# Patient Record
(demographics unavailable — no encounter records)

---

## 2024-10-16 NOTE — HISTORY OF PRESENT ILLNESS
[FreeTextEntry1] : Problem: 1) ASCUS HPV 16 + 2) thickened EMS 0.76cm 3) h/o breast ca s/p R mastectomy 2013 f/b RT?  Previous Therapies: 1) 9/30/24: PAP ASCUS; HPV 16 pos 2) TVUS 9/30/24     a) EMS 0.76cm     b) Uterus 6 x 3.5 x 3.4cm     c) ovaries not identified  63yo referred by Dr. Sommers for thickened EMS and abnormal pap smear. Pt states 2021 she had a routine US which showed thickened endometrium. She had an EMB attempted in the office and failed and proceeded to have a hysteroscopy D&C and per pt it was benign. She reports HPV+ at that time, she endorses having a colposcopy and states her OBGYN told her to come back every 6 months but pt states she lost insurance and has followed up until now. She denies VB, abnormal discharge, vaginal dryness, night sweats, and moodswings  OBHX: C/S x1 GYNHX: h/o breast cancer dx 52yo s/p R mastectomy (unknown type of Breast Ca per pt) s/p radiation, h/o tamoxifen use 10+yrs ago stopped due to side effects, 52yo menopause, h/o endometriosis PMHX: h/o breast ca 2013, HLD, pt endorses current R lung colpase?  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

## 2024-10-16 NOTE — DISCUSSION/SUMMARY
[Reviewed Clinical Lab Test(s)] : Results of clinical tests were reviewed. [Reviewed Radiology Report(s)] : Radiology reports were reviewed. [Visit Time ___ Minutes] : [unfilled] minutes [Face to Face Time___ Minutes] : with [unfilled] minutes in face to face consultation. [FreeTextEntry1] : With the aid of diagrams we reviewed the findings in detail.  We reviewed HPV its pathogenesis and the implications of an abnormal cervical cytology and the pathogenesis of dysplasia in detail.    ASCCP recommendations reviewed with patient. The recommendation is for colposcopic evaluation and biopsies if abnormal lesions noted. If colposcopy is negative, follow-up includes repeat co-testing at 12 months.   I discussed with the patient, reviewed the findings on history and physical examination, and reviewed the imaging studies in detail. She has a thickened endometrial lining.  Benign, pre-malignant (such as atypical hyperplasia) and malignant causes of these findings discussed. In order to determine the cause of her symptoms, sampling of the uterus is necessary. The uterus can be sampled either by an in-office endometrial biopsy or via D&C hysteroscopy. In the case of suspected endometrial polyp, a D&C/hysteroscopy is indicated. This allows for removal of the endometrial polyp. This is both diagnostic and therapeutic.    Complications that include, but are not limited to: bleeding, infection and uterine perforation discussed. In the case of uterine perforation, diagnostic laparoscopy may be indicated. Cervical stenosis and possible inability to enter the uterine cavity was also discussed.   Surgical scheduling was discussed and instructions for optimization prior to surgery were given. NPO after midnight.  No aspirin or NSAID products for 1 week prior. Instructions for misoprostol 48 hours prior to surgery given.  A copy of the above diagrams was given to the patient.    [ ] Colposcopy performed today, f/u results and need for excision [ ] Pt to be scheduled for hysterosocpy D&C [ ] Pt given [facility] pre-op clinic information to make appointment for clearance once surgery booked

## 2024-10-16 NOTE — DISCUSSION/SUMMARY
[Reviewed Clinical Lab Test(s)] : Results of clinical tests were reviewed. [Reviewed Radiology Report(s)] : Radiology reports were reviewed. [Visit Time ___ Minutes] : [unfilled] minutes [Face to Face Time___ Minutes] : with [unfilled] minutes in face to face consultation. [FreeTextEntry1] : With the aid of diagrams we reviewed the findings in detail.  We reviewed HPV its pathogenesis and the implications of an abnormal cervical cytology and the pathogenesis of dysplasia in detail.    ASCCP recommendations reviewed with patient. The recommendation is for colposcopic evaluation and biopsies if abnormal lesions noted. If colposcopy is negative, follow-up includes repeat co-testing at 12 months.   I discussed with the patient, reviewed the findings on history and physical examination, and reviewed the imaging studies in detail. She has a thickened endometrial lining.  Benign, pre-malignant (such as atypical hyperplasia) and malignant causes of these findings discussed. In order to determine the cause of her symptoms, sampling of the uterus is necessary. The uterus can be sampled either by an in-office endometrial biopsy or via D&C hysteroscopy. In the case of suspected endometrial polyp, a D&C/hysteroscopy is indicated. This allows for removal of the endometrial polyp. This is both diagnostic and therapeutic.    Complications that include, but are not limited to: bleeding, infection and uterine perforation discussed. In the case of uterine perforation, diagnostic laparoscopy may be indicated. Cervical stenosis and possible inability to enter the uterine cavity was also discussed.   Surgical scheduling was discussed and instructions for optimization prior to surgery were given. NPO after midnight.  No aspirin or NSAID products for 1 week prior. Instructions for misoprostol 48 hours prior to surgery given.  A copy of the above diagrams was given to the patient.    [ ] Colposcopy performed today, f/u results and need for excision [ ] Pt to be scheduled for hysterosocpy D&C [ ] Pt given [facility] pre-op clinic information to make appointment for clearance once surgery booked

## 2024-10-16 NOTE — PROCEDURE
[Colposcopy] : colposcopy [Abnormal Pap] : an abnormal pap smear [Patient] : the patient [Verbal Consent] : verbal consent was obtained prior to the procedure and is detailed in the patient's record [Normal Staining] : normal staining [No Abnormalities] : no abnormalities seen [Biopsies Taken: # ___] : [unfilled] biopsies taken of the cervix [Biopsy Locations ___ o'clock] : the biopsies were taken at the [unfilled] o'clock position [Silver Nitrate] : silver nitrate [No Complications] : none [Tolerated Well] : the patient tolerated the procedure well [Post procedure instructions and information given] : post procedure instructions and information given and reviewed with patient. [0] : 0 [Biopsy] : the lesion was not biopsied

## 2024-10-16 NOTE — PHYSICAL EXAM
[Chaperone Present] : A chaperone was present in the examining room during all aspects of the physical examination [87073] : A chaperone was present during the pelvic exam. [Normal] : Anus and perineum: Normal sphincter tone, no masses, no prolapse. [Fully active, able to carry on all pre-disease performance without restriction] : Status 0 - Fully active, able to carry on all pre-disease performance without restriction [FreeTextEntry2] : Desire

## 2024-10-16 NOTE — PHYSICAL EXAM
[Chaperone Present] : A chaperone was present in the examining room during all aspects of the physical examination [32094] : A chaperone was present during the pelvic exam. [Normal] : Anus and perineum: Normal sphincter tone, no masses, no prolapse. [Fully active, able to carry on all pre-disease performance without restriction] : Status 0 - Fully active, able to carry on all pre-disease performance without restriction [FreeTextEntry2] : Desire

## 2024-10-16 NOTE — HISTORY OF PRESENT ILLNESS
[FreeTextEntry1] : Problem: 1) ASCUS HPV 16 + 2) thickened EMS 0.76cm 3) h/o breast ca s/p R mastectomy 2013 f/b RT?  Previous Therapies: 1) 9/30/24: PAP ASCUS; HPV 16 pos 2) TVUS 9/30/24     a) EMS 0.76cm     b) Uterus 6 x 3.5 x 3.4cm     c) ovaries not identified  63yo referred by Dr. Sommers for thickened EMS and abnormal pap smear. Pt states 2021 she had a routine US which showed thickened endometrium. She had an EMB attempted in the office and failed and proceeded to have a hysteroscopy D&C and per pt it was benign. She reports HPV+ at that time, she endorses having a colposcopy and states her OBGYN told her to come back every 6 months but pt states she lost insurance and has followed up until now. She denies VB, abnormal discharge, vaginal dryness, night sweats, and moodswings  OBHX: C/S x1 GYNHX: h/o breast cancer dx 52yo s/p R mastectomy (unknown type of Breast Ca per pt) s/p radiation, h/o tamoxifen use 10+yrs ago stopped due to side effects, 52yo menopause, h/o endometriosis PMHX: h/o breast ca 2013, HLD, pt endorses current R lung colpase?  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

## 2024-11-08 NOTE — HISTORY OF PRESENT ILLNESS
[No Pertinent Cardiac History] : no history of aortic stenosis, atrial fibrillation, coronary artery disease, recent myocardial infarction, or implantable device/pacemaker [No Pertinent Pulmonary History] : no history of asthma, COPD, sleep apnea, or smoking [No Adverse Anesthesia Reaction] : no adverse anesthesia reaction in self or family member [(Patient denies any chest pain, claudication, dyspnea on exertion, orthopnea, palpitations or syncope)] : Patient denies any chest pain, claudication, dyspnea on exertion, orthopnea, palpitations or syncope [Excellent (>10 METs)] : Excellent (>10 METs) [Chronic Anticoagulation] : no chronic anticoagulation [Chronic Kidney Disease] : no chronic kidney disease [Diabetes] : no diabetes [FreeTextEntry1] : hysteroscopy with biopsy [FreeTextEntry2] : 11/8/24 [FreeTextEntry3] : Dr. Jacque Garcia [FreeTextEntry4] : 62 F with PMHx breast cancer (s/p mastectomy and reconstruction), abnormal PAP with thickened endometrium who presents for preoperative clearance for hysteroscopy with biopsy on 11/8/24 with Dr. Garcia. She feels well today without any acute complaints other than a sinus infection for which she is being treated with ciprofloxacin.

## 2024-11-08 NOTE — ASSESSMENT
[Patient Optimized for Surgery] : Patient optimized for surgery [No Further Testing Recommended] : no further testing recommended [Continue medications as is] : Continue current medications [As per surgery] : as per surgery [FreeTextEntry4] : Patient is low risk for low risk procedure  RCRI class 1, Perez score 0% EKG NSR  Labwork collected today - for a low risk procedure such as a hysteroscopy with biopsy, patient should not need presurgical bloodwork, especially given that her surgery is the following day. I explained to the patient and also called the surgical coordinator (spoke with Maru) that there is no guarantee that bloodwork will result in time the next day, and that in the future the presurgical clearance should be scheduled at least 2-3 days prior to surgery to allow enough time for reviewing bloodwork. I explained to patient and Maru that I see my own patients all day on Fridays and will try my best to expedite my review of her bloodwork in time for her surgery tomorrow morning.  Labwork reviewed - mildly elevated liver enzymes that can be worked up nonurgent outpatient with her PCP, mildly elevated A1C making her prediabetic. She has no prior anesthesia reaction, does not drink alcohol excessive, smoke cigarettes. Her mild sinus infection being treated with antibiotics is not a contraindication to procedure. Medications: simvastatin which she can take prior to procedure. Patient is medically optimized for surgery and anesthesia.  Total time spent with patient (all activities) = 25 minutes

## 2024-12-01 NOTE — HISTORY OF PRESENT ILLNESS
[FreeTextEntry1] : Problem: 1) ASCUS HPV 16 + 2) thickened EMS 0.76cm 3) h/o breast ca s/p R mastectomy 2013 f/b RT?  Previous Therapies: 1) 9/30/24: PAP ASCUS; HPV 16 pos 2) TVUS 9/30/24     a) EMS 0.76cm     b) Uterus 6 x 3.5 x 3.4cm     c) ovaries not identified 3) Colposcopy 10/16/24    a) Cervic 6, eCC wnl  4) Hysteroscopy D&C 11/8/24    a) benign inactive endometrium   Here for 1 month post op visit. Feeling well.   OBHX: C/S x1 GYNHX: h/o breast cancer dx 52yo s/p R mastectomy (unknown type of Breast Ca per pt) s/p radiation, h/o tamoxifen use 10+yrs ago stopped due to side effects, 52yo menopause, h/o endometriosis PMHX: h/o breast ca 2013, HLD, pt endorses current R lung collapse?  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

## 2024-12-01 NOTE — DISCUSSION/SUMMARY
[Reviewed Clinical Lab Test(s)] : Results of clinical tests were reviewed. [Reviewed Radiology Report(s)] : Radiology reports were reviewed. [Visit Time ___ Minutes] : [unfilled] minutes [Face to Face Time___ Minutes] : with [unfilled] minutes in face to face consultation.